# Patient Record
(demographics unavailable — no encounter records)

---

## 2024-12-11 NOTE — HISTORY OF PRESENT ILLNESS
[FreeTextEntry1] : 36F with a h/o anxiety/depression, ADHD, who presents for post ER follow up. Patient was in the [facility] ER on 11/30/24 for LLQ pain, underwent CT with diverticulitis. The imaging also showed a CBD of 8 mm, CBC with mild leukocytosis of 12.3, , AST 80, . She was rx'ed abx and completed the course yesterday. She is back to baseline and feels well.   Never had EGD or colonoscopy.   Family Hx: no GI malignancy, CD, UC  Social Hx: occasional vape, 4-8 drinks EtOH weekly, occasional marijuana, has rx for ketamine, works as a [employer]tiz/

## 2024-12-11 NOTE — ASSESSMENT
[FreeTextEntry1] : 36F with a h/o anxiety/depression, ADHD, who presents for post ER follow up.   #Abnormal liver enzymes - CT imaging with mildly dilated CBD, possibly passed sludge/stones  - re-check CMP and if persistently elevated, consider US vs MRCP   #Diverticulitis - resolved clinically at this point - schedule colonoscopy with golytely prep at [facility] vs [facility] - risks/benefits/alternatives and need for post-procedural escort discussed  Jessica Mendoza MD Gastroenterology

## 2025-07-11 NOTE — REVIEW OF SYSTEMS
[Abdominal Pain] : no abdominal pain [Vomiting] : no vomiting [Constipation] : no constipation [Diarrhea] : no diarrhea [Melena (black stool)] : no melena [Bleeding] : no bleeding [Fecal Incontinence (soiling)] : no fecal incontinence [Bloating (gassiness)] : no bloating

## 2025-07-11 NOTE — ASSESSMENT
[FreeTextEntry1] : 36F PMHx anxiety/depression, ADHD, hx diverticulitis presenting with questions in anticipation of starting GLP-1 medication iso known history of reflux.   #Reflux #Overweight  #Medication counseling #Hepatic steatosis Discussed at length that history of reflux would not be an absolute contraindication to starting Zepbound however made her aware that with uptitration of the medication, it is possible to have worsening reflux symptoms given associated delayed gastric emptying associated with the medication however per history, no other contraindications to her starting the medication No prior EGD, no red flag complaints; offered EGD to evaluate underlying reflux complaints -Counseled on need to hold Zepbound 1 week prior to planned procedure -Reviewed most recent BW  -Recommended that when she starts Zepbound, she should start an antacid medication, can start with Famotidine and if not well controlled on this, can switch to PPI  #History of diverticulitis Colonoscopy - 1/16/25: Multiple diverticula with mixed openings were seen in the the left side of the colon. Small non-bleeding external hemorrhoids were noted. -Reviewed prior colonoscopy findings -Due 2035

## 2025-07-11 NOTE — HISTORY OF PRESENT ILLNESS
[FreeTextEntry1] : 36F PMHx anxiety/depression, ADHD, hx diverticulitis presenting with questions in anticipation of starting GLP-1 medication iso known history of reflux.   Former patient of Dr Mendoza. Seen for episode of diverticulitis. s/p colonoscopy, notable for diverticulosis on left side of the colon in Jan 2025.    Patient here today as recently prescribed Zepbound for weight/HLD. BMI 28.98, today's weight 185 lb. Ideally would like to lose 20-30 lbs.  In terms of her prior weight loss efforts, she reports working out 3x/week with resistance training. Will get 10k steps in 4-5 days/week  States she has a history of reflux that occurs intermittently. Notes that she usually experiences this at night and usually iso eating known triggers. Also likes to try new foods so sometimes cant anticipate when it'll trigger her reflux. Was recommended a trial of Reflux Gourmet (Alginate) to take when she knows she's going to eat something that will trigger her symptoms.  Denies any dysphagia, odynophagia.    Bowel habits are regular, non-bloody stools. No abdominal pain.   Remainder of ROS negative.  BW: Hgb 12.8  Cr 0.68 TB 0.3 Alk phos 83 AST 19 ALT 26  PMHx - anxiety/depression, ADHD PSHx - denies Rx -Adderall, omeprazole and Famotidine PRN Supplements/herbs/OTC - MVI A/C or NSAIDs? - denies FMHx - no GI malignancy, CD, UC; no FMHx MEN syndrome Allergies - NKDA EtOH -4-8 drinks EtOH weekly Smoking - occasional vape Drugs - occasional marijuana  EGD - no prior Colonoscopy - 1/16/25: Multiple diverticula with mixed openings were seen in the the left side of the colon. Diverticulosis appeared to be of moderate severity. Small non-bleeding external hemorrhoids were noted.